# Patient Record
Sex: MALE | Race: WHITE | NOT HISPANIC OR LATINO | Employment: FULL TIME | URBAN - METROPOLITAN AREA
[De-identification: names, ages, dates, MRNs, and addresses within clinical notes are randomized per-mention and may not be internally consistent; named-entity substitution may affect disease eponyms.]

---

## 2018-09-12 NOTE — PROGRESS NOTES
Established Patient Evaluation     Edilberto Jimenez  Encounter Date: 9/18/2018   YOB: 1970  Provider: Barbara Reaves MD.  Requesting MD: Dr. Hurtado  PCP: Dr. Reinalod Iniguez. Onc.: Dr. Tuan Conti. Onc.: Dr. Stack     Chief Complaint: follow up SCCA tonsil --S/P left neck dissection and bx 3/2012: patient known to me   Location: tonsil   Severity: no pain   Onset / Duration: n/a   Timing: constant   Quality: n/a   Context: n/a   Modifying Factors: n/a   Associated Signs / Symptoms: n/a     HPI: Edilberto Jimenez is a 46 y.o. 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      8/23/2017  He presented today for routine followup.    He was just seen by Dr Stack who did blood work and CXR--patient states that is is normal.     No new complaints.  He saw Dr Stack last month.  All tests were normal.   He saw a Functional medicine MD (Dr Sierra)--6 month program.  He lost 20 pounds but gained it back--life style change.     He has low testerone and did well after an injection.    9/18/2018    Here today for routine F/U.   No new masses, pain, bleeding.  He recently had laser treatment for telangiectasias on his neck where he was irradiated  He continues with fatigue  He has low thyroid and testosterone and meds are helping some.  He also C/O bilateral epiphora--intermittent. He denies dry eyes, allergies, nasal masses--no previous hx.       ROS: Review of Systems   Constitutional: Negative for chills, diaphoresis, fever, malaise/fatigue and weight loss.   HENT: Negative for congestion, ear discharge, ear pain, hearing loss, nosebleeds, sore throat and tinnitus otalgia  Dysphagia: Negative  Eyes: Negative for blurred vision, double vision, photophobia, pain, discharge and redness.   Respiratory: Negative for cough, hemoptysis, sputum production, shortness of breath, wheezing and stridor.    Cardiovascular: Negative for chest pain, palpitations, orthopnea,  claudication, leg swelling and PND.   Gastrointestinal: Negative for abdominal pain, blood in stool, constipation, diarrhea, heartburn, melena, nausea and vomiting.   Genitourinary: Negative for dysuria, flank pain, frequency, hematuria and urgency.   Musculoskeletal: Negative for back pain, falls, joint pain, myalgias and neck pain.   Skin: Negative for itching and rash.   Neurological: Negative for dizziness, tingling, tremors, sensory change, speech change, focal weakness, seizures, loss of consciousness, weakness and headaches.   Endo/Heme/Allergies: Negative for environmental allergies and polydipsia (dry mouth). Does not bruise/bleed easily.   Psychiatric/Behavioral: Negative for depression, hallucinations, memory loss, substance abuse and suicidal ideas. The patient is not nervous/anxious and does not have insomnia.        Physical Exam:     Constitutional  · General Appearance: well nourished, well-developed, alert, oriented, in no acute distress  · Communication: ability, understanding, voice quality normal  Head and Face  · Inspection: normocephalic, atraumatic, no scars, lesions or masses   · Palpation: no stepoffs, sinus tenderness or masses  · Parotid glands: no masses, stones, swelling or tenderness  Oral Cavity / Oropharynx  · Lips: upper and lower lips pink and moist  · Teeth: dentition within normal limits for age  · Gingiva: healthy  · Oral Mucosa: moist, no mucosal lesions  · Floor of Mouth: normal, no lesions, salivary ducts patent  · Tongue: moist, normal mobility, no lesions  · Palate: soft and hard palates without lesions or ulcer; soft palate with atrophic mucosa and fibrosis  · opharynx: tonsils absent, no lesions, fullness or obstruction, negative digital palpation  Nasopharynx, Hypopharynx, and Larynx  · Indirect: could not visualize well  Neck  · Inspection and Palpation: no erythema, induration, emphysema, tenderness or masses. Well healed incision left neck -- telangiectasias resolved;   normal crepitus; no masses/edema  · Larynx and Trachea: normal position, mobility and crepitus  · Thyroid: no tenderness, enlargement or nodules  · Submandibular Glands: no masses or tenderness  Lymphatic:  · Anterior, Posterior, Submandibular, Submental, Supraclavicular: no lymphadenopathy present  Neuro:  · Cranial Nerves: grossly intact  · General: no focal deficits     Psychiatric  · Orientation: oriented to time, place and person  · Mood and Affect: no depression, anxiety or agitation        Procedures:  -------------------- LARYNGOSCOPY / NASOPHARYNGOSCOPY -------------------------     Pre-op DX: OP cancer  Post-op DX: OP cancer  Anesthesia: Topical Neosynephrine / Tetracaine  Indications: to evaluate for recurrence  Adverse Events: None        Procedure in Detail:     Flexible endoscopy with video was performed through the nasal passages. The nasal cavity, nasopharynx, oropharynx, hypopharynx and larynx were adequately visualized. The true vocal cords and arytenoids were examined during phonation and repose.        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 However he has not had a CXR in 2 years for surveillance.    Plan:  Routine CXR for surveillance  Rec natural tears 3-4 times/day --if this does not resolve epiphora, then rec for him to see Ophthalmologist  F/U 1 year

## 2018-09-18 ENCOUNTER — OFFICE VISIT (OUTPATIENT)
Dept: OTOLARYNGOLOGY | Facility: CLINIC | Age: 48
End: 2018-09-18
Payer: COMMERCIAL

## 2018-09-18 VITALS
WEIGHT: 212.06 LBS | DIASTOLIC BLOOD PRESSURE: 90 MMHG | BODY MASS INDEX: 33.28 KG/M2 | HEART RATE: 104 BPM | SYSTOLIC BLOOD PRESSURE: 144 MMHG | HEIGHT: 67 IN

## 2018-09-18 DIAGNOSIS — K21.9 LARYNGOPHARYNGEAL REFLUX (LPR): ICD-10-CM

## 2018-09-18 DIAGNOSIS — H04.203 BILATERAL EPIPHORA: ICD-10-CM

## 2018-09-18 DIAGNOSIS — Z85.819 HISTORY OF OROPHARYNGEAL CANCER: Primary | ICD-10-CM

## 2018-09-18 PROCEDURE — 99999 PR PBB SHADOW E&M-NEW PATIENT-LVL III: CPT | Mod: PBBFAC,,, | Performed by: OTOLARYNGOLOGY

## 2018-09-18 PROCEDURE — 31575 DIAGNOSTIC LARYNGOSCOPY: CPT | Mod: S$GLB,,, | Performed by: OTOLARYNGOLOGY

## 2018-09-18 PROCEDURE — 3008F BODY MASS INDEX DOCD: CPT | Mod: CPTII,S$GLB,, | Performed by: OTOLARYNGOLOGY

## 2018-09-18 PROCEDURE — 99214 OFFICE O/P EST MOD 30 MIN: CPT | Mod: 25,S$GLB,, | Performed by: OTOLARYNGOLOGY

## 2018-09-18 RX ORDER — FINASTERIDE 1 MG/1
1 TABLET, FILM COATED ORAL DAILY
Refills: 11 | COMMUNITY
Start: 2018-07-20

## 2018-09-18 RX ORDER — ICOSAPENT ETHYL 1000 MG/1
CAPSULE ORAL
COMMUNITY
Start: 2017-05-05 | End: 2021-05-06 | Stop reason: SDUPTHER

## 2018-09-18 RX ORDER — ESOMEPRAZOLE MAGNESIUM 40 MG/1
CAPSULE, DELAYED RELEASE ORAL
COMMUNITY
Start: 2013-12-13 | End: 2019-03-12 | Stop reason: DRUGHIGH

## 2018-09-18 RX ORDER — SALIVA SUBSTITUTE COMB NO.11 351 MG
POWDER IN PACKET (EA) MUCOUS MEMBRANE
COMMUNITY
Start: 2018-07-05

## 2018-09-18 RX ORDER — TESTOSTERONE CYPIONATE 200 MG/ML
INJECTION, SOLUTION INTRAMUSCULAR
COMMUNITY
Start: 2018-04-18

## 2018-10-02 ENCOUNTER — HOSPITAL ENCOUNTER (OUTPATIENT)
Dept: RADIOLOGY | Facility: HOSPITAL | Age: 48
Discharge: HOME | End: 2018-10-02
Attending: OTOLARYNGOLOGY
Payer: COMMERCIAL

## 2018-10-02 DIAGNOSIS — Z85.819 HISTORY OF OROPHARYNGEAL CANCER: ICD-10-CM

## 2018-10-02 PROCEDURE — 71046 X-RAY EXAM CHEST 2 VIEWS: CPT | Mod: TC,FY,PO

## 2018-10-02 PROCEDURE — 71046 X-RAY EXAM CHEST 2 VIEWS: CPT | Mod: 26,,, | Performed by: RADIOLOGY

## 2018-10-11 ENCOUNTER — TELEPHONE (OUTPATIENT)
Dept: OTOLARYNGOLOGY | Facility: CLINIC | Age: 48
End: 2018-10-11

## 2018-10-11 NOTE — TELEPHONE ENCOUNTER
----- Message from Nasim Duran sent at 10/11/2018 10:06 AM CDT -----  Patient would like call back in regards to some test results - please call to advise. [phone]

## 2018-10-11 NOTE — TELEPHONE ENCOUNTER
----- Message from Sunil Flores sent at 10/11/2018 11:10 AM CDT -----  Type:  Patient Returning Call    Who Called:  Patient  Who Left Message for Patient:  Lluvia  Does the patient know what this is regarding?:  Chest X ray  Best Call Back Number:  [phone]

## 2019-03-04 ENCOUNTER — TELEPHONE (OUTPATIENT)
Dept: OTOLARYNGOLOGY | Facility: CLINIC | Age: 49
End: 2019-03-04

## 2019-03-04 NOTE — TELEPHONE ENCOUNTER
Spoke with pharmacy patient has not picked up a prescription for Nexium since 2018 so prescription is . Explained Dr. Reaves will address once she returns 2019. Also call patient and explained he verbalized understanding.

## 2019-03-04 NOTE — TELEPHONE ENCOUNTER
----- Message from Sirisha Espitia sent at 3/2/2019 10:57 AM CST -----  Contact: patient  Type:  RX Refill Request    Who Called:  patient  Refill or New Rx:  refill  RX Name and Strength:  esomeprazole (NEXIUM) 40 MG capsule    How is the patient currently taking it? (ex. 1XDay):  TAKE 1 CAPSULE BY MOUTH TWICE DAILY BEFORE MEALS  Is this a 30 day or 90 day RX:    Preferred Pharmacy with phone number:    [facility] in [place], [place]   Address: [address] [place] [address]  Phone: [phone]      Local or Mail Order:  local  Ordering Provider:  Jean Paul Patel Call Back Number:  [phone]  Additional Information:

## 2019-03-12 DIAGNOSIS — K21.9 LARYNGOPHARYNGEAL REFLUX (LPR): Primary | ICD-10-CM

## 2019-03-12 RX ORDER — ESOMEPRAZOLE MAGNESIUM 40 MG/1
40 CAPSULE, DELAYED RELEASE ORAL
Qty: 30 CAPSULE | Refills: 11 | Status: SHIPPED | OUTPATIENT
Start: 2019-03-12 | End: 2021-05-06 | Stop reason: SDUPTHER

## 2019-03-25 ENCOUNTER — TELEPHONE (OUTPATIENT)
Dept: OTOLARYNGOLOGY | Facility: CLINIC | Age: 49
End: 2019-03-25

## 2019-03-25 NOTE — TELEPHONE ENCOUNTER
----- Message from Jennifer Whyte sent at 3/25/2019  3:16 PM CDT -----  Contact: self  Patient [phone] is calling to check the status of the prior authorization on the medication Nexium/please advise

## 2019-03-25 NOTE — TELEPHONE ENCOUNTER
Spoke with patient advised was able to contact [facility] pharmacy which is where patient had prescription transferred to, to send over prior authorization form. Received it via fax will have it worked up and sent to pharmacy. Patient verbalized understanding

## 2021-04-21 ENCOUNTER — TELEPHONE (OUTPATIENT)
Dept: HEMATOLOGY/ONCOLOGY | Facility: CLINIC | Age: 51
End: 2021-04-21

## 2021-05-06 ENCOUNTER — OFFICE VISIT (OUTPATIENT)
Dept: HEMATOLOGY/ONCOLOGY | Facility: CLINIC | Age: 51
End: 2021-05-06
Payer: COMMERCIAL

## 2021-05-06 VITALS
HEIGHT: 67 IN | OXYGEN SATURATION: 97 % | WEIGHT: 196.13 LBS | BODY MASS INDEX: 30.78 KG/M2 | HEART RATE: 109 BPM | RESPIRATION RATE: 14 BRPM | DIASTOLIC BLOOD PRESSURE: 97 MMHG | TEMPERATURE: 97 F | SYSTOLIC BLOOD PRESSURE: 161 MMHG

## 2021-05-06 DIAGNOSIS — K21.9 LARYNGOPHARYNGEAL REFLUX (LPR): ICD-10-CM

## 2021-05-06 DIAGNOSIS — Z85.819 HISTORY OF OROPHARYNGEAL CANCER: ICD-10-CM

## 2021-05-06 DIAGNOSIS — Z71.89 ENCOUNTER FOR HERB AND VITAMIN SUPPLEMENT MANAGEMENT: Primary | ICD-10-CM

## 2021-05-06 PROCEDURE — 1126F AMNT PAIN NOTED NONE PRSNT: CPT | Mod: S$GLB,,, | Performed by: OTOLARYNGOLOGY

## 2021-05-06 PROCEDURE — 99213 PR OFFICE/OUTPT VISIT, EST, LEVL III, 20-29 MIN: ICD-10-PCS | Mod: 25,S$GLB,, | Performed by: OTOLARYNGOLOGY

## 2021-05-06 PROCEDURE — 31575 DIAGNOSTIC LARYNGOSCOPY: CPT | Mod: S$GLB,,, | Performed by: OTOLARYNGOLOGY

## 2021-05-06 PROCEDURE — 31575 PR LARYNGOSCOPY, FLEXIBLE; DIAGNOSTIC: ICD-10-PCS | Mod: S$GLB,,, | Performed by: OTOLARYNGOLOGY

## 2021-05-06 PROCEDURE — 3008F BODY MASS INDEX DOCD: CPT | Mod: CPTII,S$GLB,, | Performed by: OTOLARYNGOLOGY

## 2021-05-06 PROCEDURE — 1126F PR PAIN SEVERITY QUANTIFIED, NO PAIN PRESENT: ICD-10-PCS | Mod: S$GLB,,, | Performed by: OTOLARYNGOLOGY

## 2021-05-06 PROCEDURE — 99213 OFFICE O/P EST LOW 20 MIN: CPT | Mod: 25,S$GLB,, | Performed by: OTOLARYNGOLOGY

## 2021-05-06 PROCEDURE — 99999 PR PBB SHADOW E&M-EST. PATIENT-LVL III: CPT | Mod: PBBFAC,,, | Performed by: OTOLARYNGOLOGY

## 2021-05-06 PROCEDURE — 99999 PR PBB SHADOW E&M-EST. PATIENT-LVL III: ICD-10-PCS | Mod: PBBFAC,,, | Performed by: OTOLARYNGOLOGY

## 2021-05-06 PROCEDURE — 3008F PR BODY MASS INDEX (BMI) DOCUMENTED: ICD-10-PCS | Mod: CPTII,S$GLB,, | Performed by: OTOLARYNGOLOGY

## 2021-05-06 RX ORDER — ICOSAPENT ETHYL 1000 MG/1
1 CAPSULE ORAL 2 TIMES DAILY
Qty: 60 CAPSULE | Refills: 6 | Status: SHIPPED | OUTPATIENT
Start: 2021-05-06 | End: 2021-06-05

## 2021-05-06 RX ORDER — THYROID 60 MG/1
60 TABLET ORAL
COMMUNITY

## 2021-05-06 RX ORDER — ESOMEPRAZOLE MAGNESIUM 40 MG/1
40 CAPSULE, DELAYED RELEASE ORAL
Qty: 30 CAPSULE | Refills: 11 | Status: SHIPPED | OUTPATIENT
Start: 2021-05-06 | End: 2022-11-11 | Stop reason: SDUPTHER

## 2022-04-07 ENCOUNTER — TELEPHONE (OUTPATIENT)
Dept: HEMATOLOGY/ONCOLOGY | Facility: CLINIC | Age: 52
End: 2022-04-07
Payer: COMMERCIAL

## 2022-05-03 NOTE — PROGRESS NOTES
Date of Encounter: 5/6/2021  Provider: Barbara Reaves MD  PCP: Reinaldo Boss Jr, MD  Rad Onc: Alex Dickerson MD  Med Onc: Jose M Stack MD    CC: history of oropharyngeal cancer S/P neck dissection and chemoradiation    HPI:      The patient is a 50-year-old male who is well known to me.  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  He presents today for routine cancer surveillance.  He has not undergone examination in about a year due to the pandemic and personal issues (his wife was diagnosed and treated for breast cancer).  He has no complaints.  He denies dysphagia, odynophagia, otalgia, hemoptysis and new lesions.    He reports that he continues to take Nexium on an as-needed basis and would like a refill.    The patient is followed by Dr. Isra Stack the [facility] in [facility].  He has a history of iron deficiency anemia due to a previous surgery for emergency appendectomy in 2004.  He has had no recent blood transfusions.    Patient also reports that he is now on a diet and exercise program and he is feeling better.    5/5/2022  Patient is here today for his yearly follow-up for cancer surveillance.  Patient has no complaints.  He denies dysphagia, odynophagia, otalgia, neck masses and weight loss.  He is tolerating a regular diet and has no physical limitations following his neck dissection.      ROS: see HPI  Constitutional: Negative for activity change and appetite change, weight loss.   Eyes: Negative for discharge, visual changes.   Respiratory: Negative for difficulty breathing and wheezing   Cardiovascular: Negative for chest pain.   Gastrointestinal: Negative for abdominal distention and abdominal pain.   Endocrine: Negative for cold intolerance and heat intolerance.   Genitourinary: Negative for dysuria.   Musculoskeletal: Negative for gait problem, muscle pain and joint swelling.   Skin:  Negative for color change and pallor; negative for skin lesions.   Neurological: Negative for syncope and weakness; no numbness face.   Psychiatric/Behavioral: Negative for agitation and confusion; negative for depression.    Physical Exam:      Constitutional  · General Appearance: well nourished, well-developed, alert, oriented, in no acute distress; over weight  · Communication: ability, understanding, normal  Head and Face  · Inspection: normocephalic, atraumatic, no scars, lesions or masses   · Palpation: no stepoffs, sinus tenderness or masses  · Parotid glands: no masses, stones, swelling or tenderness  Eyes  · Ocular Motility / Alignment: normal alignment, motility, no proptosis, enophthalmus or nystagmus  · Conjunctiva: not injected  · Eyelids: no hooding, lag, entropion, or ectropion  Ears  · Hearing: speech reception thresholds grossly normal  · External Ears: no auricle lesions, non-tender, mobile to palpation  · Otoscopy:  · Right Ear: no tympanic membrane lesions, perforations, or effusion, normal EAC  · Left Ear: no tympanic membrane lesions, perforations, or effusion, normal EAC  Nose  · External Nose: no lesions, tenderness, trauma or deformity  · Intranasal Exam: no edema, erythema, discharge, mass or obstruction  Oral Cavity / Oropharynx  · Lips: upper and lower lips pink and moist  · Teeth: good dentition  · Gingiva: healthy  · Oral Mucosa: moist, no mucosal lesions  · Floor of Mouth: normal, no lesions, salivary ducts patent  · Tongue: moist, normal mobility, no lesions  · Palate: soft and hard palates without lesions or ulcers  Oropharynx: tonsils absent; base of tongue soft to palpation, soft palate fibrotic  Nasopharynx, Hypopharynx, and Larynx  · Indirect: could not perform exam due to intolerance by patient  Neck  · Inspection and Palpation: no erythema, induration, emphysema, tenderness or masses, well healed hockey stick incision left neck  · Larynx and Trachea: normal position; normal  crepitus  · Thyroid: no tenderness, enlargement or nodules  · Submandibular Glands: no masses or tenderness  Lymphatic:  · Anterior, Posterior, Submandibular, Submental, Supraclavicular: no lymphadenopathy present  Chest / Respiratory  · Chest: no stridor or retractions, normal effort and expansion  Cardiovascular:  · Pulses: 2+ carotid pulses bilaterally  · Auscultation: deferred  Neurological  · Cranial Nerves: grossly intact  · General: no focal deficits  Psychiatric  · Orientation: oriented to time, place and person  · Mood and Affect: no depression, anxiety or agitation  Extremities  · Inspection: moves all extremities well  Donor site  Chest, Back, Abdomen, Arms, Legs: N/A    PROCEDURES:   -------------------- LARYNGOSCOPY / NASOPHARYNGOSCOPY -------------------------     Pre-op DX: hx of OP cancer; LPR  Post-op DX: same  Anesthesia: Topical Neosynephrine / Tetracaine  Indications: to look at larynx and pharynx  Adverse Events: None        Procedure in Detail:     Flexible endoscopy with video was performed through the nasal passages. The nasal cavity, nasopharynx, oropharynx, hypopharynx and larynx were adequately visualized. The true vocal cords and arytenoids were examined during phonation and repose.        Operative Findings:     Nasal Cavity: Within normal limits  Nasopharynx: Within normal limits  Tongue Base: Within normal limits  Pharyngeal Walls: Within normal limits  Epiglottis / Aryepiglottic Folds: Within normal limits  Pyriform Sinus: Within normal limits  Vocal Cords: Within normal limitse  Arytenoids: Within normal limits; ,mild IA edema        Assessment:   History of oropharyngeal carcinoma-LIVIA    Plan:  Follow-up in 1 year

## 2022-05-05 ENCOUNTER — OFFICE VISIT (OUTPATIENT)
Dept: HEMATOLOGY/ONCOLOGY | Facility: CLINIC | Age: 52
End: 2022-05-05
Payer: COMMERCIAL

## 2022-05-05 VITALS
DIASTOLIC BLOOD PRESSURE: 86 MMHG | HEART RATE: 110 BPM | RESPIRATION RATE: 18 BRPM | OXYGEN SATURATION: 97 % | SYSTOLIC BLOOD PRESSURE: 137 MMHG | WEIGHT: 210.31 LBS | HEIGHT: 67 IN | TEMPERATURE: 98 F | BODY MASS INDEX: 33.01 KG/M2

## 2022-05-05 DIAGNOSIS — Z85.819 HISTORY OF OROPHARYNGEAL CANCER: Primary | ICD-10-CM

## 2022-05-05 PROCEDURE — 31575 PR LARYNGOSCOPY, FLEXIBLE; DIAGNOSTIC: ICD-10-PCS | Mod: S$GLB,,, | Performed by: OTOLARYNGOLOGY

## 2022-05-05 PROCEDURE — 3008F PR BODY MASS INDEX (BMI) DOCUMENTED: ICD-10-PCS | Mod: CPTII,S$GLB,, | Performed by: OTOLARYNGOLOGY

## 2022-05-05 PROCEDURE — 1160F PR REVIEW ALL MEDS BY PRESCRIBER/CLIN PHARMACIST DOCUMENTED: ICD-10-PCS | Mod: CPTII,S$GLB,, | Performed by: OTOLARYNGOLOGY

## 2022-05-05 PROCEDURE — 3008F BODY MASS INDEX DOCD: CPT | Mod: CPTII,S$GLB,, | Performed by: OTOLARYNGOLOGY

## 2022-05-05 PROCEDURE — 3075F PR MOST RECENT SYSTOLIC BLOOD PRESS GE 130-139MM HG: ICD-10-PCS | Mod: CPTII,S$GLB,, | Performed by: OTOLARYNGOLOGY

## 2022-05-05 PROCEDURE — 1160F RVW MEDS BY RX/DR IN RCRD: CPT | Mod: CPTII,S$GLB,, | Performed by: OTOLARYNGOLOGY

## 2022-05-05 PROCEDURE — 99214 OFFICE O/P EST MOD 30 MIN: CPT | Mod: 25,S$GLB,, | Performed by: OTOLARYNGOLOGY

## 2022-05-05 PROCEDURE — 3079F PR MOST RECENT DIASTOLIC BLOOD PRESSURE 80-89 MM HG: ICD-10-PCS | Mod: CPTII,S$GLB,, | Performed by: OTOLARYNGOLOGY

## 2022-05-05 PROCEDURE — 31575 DIAGNOSTIC LARYNGOSCOPY: CPT | Mod: S$GLB,,, | Performed by: OTOLARYNGOLOGY

## 2022-05-05 PROCEDURE — 3075F SYST BP GE 130 - 139MM HG: CPT | Mod: CPTII,S$GLB,, | Performed by: OTOLARYNGOLOGY

## 2022-05-05 PROCEDURE — 99214 PR OFFICE/OUTPT VISIT, EST, LEVL IV, 30-39 MIN: ICD-10-PCS | Mod: 25,S$GLB,, | Performed by: OTOLARYNGOLOGY

## 2022-05-05 PROCEDURE — 1159F PR MEDICATION LIST DOCUMENTED IN MEDICAL RECORD: ICD-10-PCS | Mod: CPTII,S$GLB,, | Performed by: OTOLARYNGOLOGY

## 2022-05-05 PROCEDURE — 1159F MED LIST DOCD IN RCRD: CPT | Mod: CPTII,S$GLB,, | Performed by: OTOLARYNGOLOGY

## 2022-05-05 PROCEDURE — 3079F DIAST BP 80-89 MM HG: CPT | Mod: CPTII,S$GLB,, | Performed by: OTOLARYNGOLOGY

## 2022-05-05 PROCEDURE — 99999 PR PBB SHADOW E&M-EST. PATIENT-LVL IV: CPT | Mod: PBBFAC,,, | Performed by: OTOLARYNGOLOGY

## 2022-05-05 PROCEDURE — 99999 PR PBB SHADOW E&M-EST. PATIENT-LVL IV: ICD-10-PCS | Mod: PBBFAC,,, | Performed by: OTOLARYNGOLOGY

## 2022-11-11 ENCOUNTER — TELEPHONE (OUTPATIENT)
Dept: HEMATOLOGY/ONCOLOGY | Facility: CLINIC | Age: 52
End: 2022-11-11
Payer: COMMERCIAL

## 2022-11-11 DIAGNOSIS — K21.9 LARYNGOPHARYNGEAL REFLUX (LPR): ICD-10-CM

## 2022-11-11 NOTE — TELEPHONE ENCOUNTER
----- Message from Hung Carrasco sent at 2022  1:00 PM CST -----  Type: Need Medical Advice  Who Called: Wife of patient  Best callback number:[phone]   Additional Info:Wife called and would like his prescription sent to the above pharmacy and would like a 90 day supply with refills   Please call to further assist, Thanks        Pharmacy Name:  [facility] [id number] [address]  Phone   Prescription Name:  esomeprazole (NEXIUM) 40 MG capsule (   Best Call Back Number: [phone]

## 2022-11-12 RX ORDER — ESOMEPRAZOLE MAGNESIUM 40 MG/1
40 CAPSULE, DELAYED RELEASE ORAL
Qty: 90 CAPSULE | Refills: 3 | Status: SHIPPED | OUTPATIENT
Start: 2022-11-12 | End: 2023-11-12

## 2022-12-02 ENCOUNTER — TELEPHONE (OUTPATIENT)
Dept: HEMATOLOGY/ONCOLOGY | Facility: CLINIC | Age: 52
End: 2022-12-02
Payer: COMMERCIAL

## 2022-12-05 ENCOUNTER — OFFICE VISIT (OUTPATIENT)
Dept: HEMATOLOGY/ONCOLOGY | Facility: CLINIC | Age: 52
End: 2022-12-05
Payer: COMMERCIAL

## 2022-12-05 VITALS
BODY MASS INDEX: 34.95 KG/M2 | RESPIRATION RATE: 16 BRPM | TEMPERATURE: 98 F | DIASTOLIC BLOOD PRESSURE: 101 MMHG | OXYGEN SATURATION: 95 % | HEIGHT: 67 IN | SYSTOLIC BLOOD PRESSURE: 153 MMHG | HEART RATE: 92 BPM | WEIGHT: 222.69 LBS

## 2022-12-05 DIAGNOSIS — K13.79 LESION OF HARD PALATE: Primary | ICD-10-CM

## 2022-12-05 PROCEDURE — 3008F PR BODY MASS INDEX (BMI) DOCUMENTED: ICD-10-PCS | Mod: CPTII,S$GLB,, | Performed by: OTOLARYNGOLOGY

## 2022-12-05 PROCEDURE — 3077F PR MOST RECENT SYSTOLIC BLOOD PRESSURE >= 140 MM HG: ICD-10-PCS | Mod: CPTII,S$GLB,, | Performed by: OTOLARYNGOLOGY

## 2022-12-05 PROCEDURE — 1160F RVW MEDS BY RX/DR IN RCRD: CPT | Mod: CPTII,S$GLB,, | Performed by: OTOLARYNGOLOGY

## 2022-12-05 PROCEDURE — 1159F MED LIST DOCD IN RCRD: CPT | Mod: CPTII,S$GLB,, | Performed by: OTOLARYNGOLOGY

## 2022-12-05 PROCEDURE — 99213 PR OFFICE/OUTPT VISIT, EST, LEVL III, 20-29 MIN: ICD-10-PCS | Mod: S$GLB,,, | Performed by: OTOLARYNGOLOGY

## 2022-12-05 PROCEDURE — 3080F DIAST BP >= 90 MM HG: CPT | Mod: CPTII,S$GLB,, | Performed by: OTOLARYNGOLOGY

## 2022-12-05 PROCEDURE — 1160F PR REVIEW ALL MEDS BY PRESCRIBER/CLIN PHARMACIST DOCUMENTED: ICD-10-PCS | Mod: CPTII,S$GLB,, | Performed by: OTOLARYNGOLOGY

## 2022-12-05 PROCEDURE — 99999 PR PBB SHADOW E&M-EST. PATIENT-LVL IV: CPT | Mod: PBBFAC,,, | Performed by: OTOLARYNGOLOGY

## 2022-12-05 PROCEDURE — 99213 OFFICE O/P EST LOW 20 MIN: CPT | Mod: S$GLB,,, | Performed by: OTOLARYNGOLOGY

## 2022-12-05 PROCEDURE — 3008F BODY MASS INDEX DOCD: CPT | Mod: CPTII,S$GLB,, | Performed by: OTOLARYNGOLOGY

## 2022-12-05 PROCEDURE — 3077F SYST BP >= 140 MM HG: CPT | Mod: CPTII,S$GLB,, | Performed by: OTOLARYNGOLOGY

## 2022-12-05 PROCEDURE — 3080F PR MOST RECENT DIASTOLIC BLOOD PRESSURE >= 90 MM HG: ICD-10-PCS | Mod: CPTII,S$GLB,, | Performed by: OTOLARYNGOLOGY

## 2022-12-05 PROCEDURE — 1159F PR MEDICATION LIST DOCUMENTED IN MEDICAL RECORD: ICD-10-PCS | Mod: CPTII,S$GLB,, | Performed by: OTOLARYNGOLOGY

## 2022-12-05 PROCEDURE — 99999 PR PBB SHADOW E&M-EST. PATIENT-LVL IV: ICD-10-PCS | Mod: PBBFAC,,, | Performed by: OTOLARYNGOLOGY

## 2022-12-05 RX ORDER — DUTASTERIDE 0.5 MG/1
0.5 CAPSULE, LIQUID FILLED ORAL
COMMUNITY
Start: 2022-11-17

## 2022-12-05 RX ORDER — TRIAMCINOLONE ACETONIDE 1 MG/G
PASTE DENTAL
Qty: 5 G | Refills: 12 | Status: SHIPPED | OUTPATIENT
Start: 2022-12-05 | End: 2022-12-05 | Stop reason: CLARIF

## 2022-12-05 RX ORDER — TRIAMCINOLONE ACETONIDE 1 MG/G
PASTE DENTAL
Qty: 5 G | Refills: 1 | Status: SHIPPED | OUTPATIENT
Start: 2022-12-05

## 2022-12-05 NOTE — PROGRESS NOTES
Date of Encounter: 5/6/2021  Provider: Barbara Reaves MD  PCP: Reinaldo Boss Jr, MD  Rad Onc: Alex Dickerson MD  Med Onc: Jose M Stack MD    CC: history of oropharyngeal cancer S/P neck dissection and chemoradiation    HPI:      The patient is a 50-year-old male who is well known to me.  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  He presents today for routine cancer surveillance.  He has not undergone examination in about a year due to the pandemic and personal issues (his wife was diagnosed and treated for breast cancer).  He has no complaints.  He denies dysphagia, odynophagia, otalgia, hemoptysis and new lesions.    He reports that he continues to take Nexium on an as-needed basis and would like a refill.    The patient is followed by Dr. Isra Stack the [facility] in [facility].  He has a history of iron deficiency anemia due to a previous surgery for emergency appendectomy in 2004.  He has had no recent blood transfusions.    Patient also reports that he is now on a diet and exercise program and he is feeling better.    5/5/2022  Patient is here today for his yearly follow-up for cancer surveillance.  Patient has no complaints.  He denies dysphagia, odynophagia, otalgia, neck masses and weight loss.  He is tolerating a regular diet and has no physical limitations following his neck dissection.    12/5/2022  Patient is here today for evaluation of a reddish lesion roof of mouth and white lesion left cheek noted by his dentist.    Patient denies pain, bleeding or any other symptoms.  He did start using a new oral spray about a month ago.      ROS: see HPI  Constitutional: Negative for activity change and appetite change, weight loss.   Eyes: Negative for discharge, visual changes.   Respiratory: Negative for difficulty breathing and wheezing   Cardiovascular: Negative for chest pain.    Gastrointestinal: Negative for abdominal distention and abdominal pain.   Endocrine: Negative for cold intolerance and heat intolerance.   Genitourinary: Negative for dysuria.   Musculoskeletal: Negative for gait problem, muscle pain and joint swelling.   Skin: Negative for color change and pallor; negative for skin lesions.   Neurological: Negative for syncope and weakness; no numbness face.   Psychiatric/Behavioral: Negative for agitation and confusion; negative for depression.    Physical Exam:      Constitutional  General Appearance: well nourished, well-developed, alert, oriented, in no acute distress; over weight  Communication: ability, understanding, normal  Head and Face  Inspection: normocephalic, atraumatic, no scars, lesions or masses   Palpation: no stepoffs, sinus tenderness or masses  Parotid glands: no masses, stones, swelling or tenderness  Oral Cavity / Oropharynx  Lips: upper and lower lips pink and moist  Teeth: good dentition  Gingiva: healthy  Oral Mucosa: moist, no mucosal lesions; < 1 cm white lesion, barely visible, left mid cheek; smooth to palpation  Floor of Mouth: normal, no lesions, salivary ducts patent  Tongue: moist, normal mobility, no lesions  Palate: hard palate: red, irritated appearing lesion anterior hard palate; smooth to palpation; no raised; soft palate with atrophic mucosa  Neurological  Cranial Nerves: grossly intact  General: no focal deficits  Psychiatric  Orientation: oriented to time, place and person  Mood and Affect: no depression, anxiety or agitation  Extremities  Inspection: moves all extremities well         Assessment:   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

## 2023-10-04 ENCOUNTER — TELEPHONE (OUTPATIENT)
Dept: SURGERY | Facility: CLINIC | Age: 53
End: 2023-10-04
Payer: COMMERCIAL

## 2023-10-04 ENCOUNTER — OFFICE VISIT (OUTPATIENT)
Dept: HEMATOLOGY/ONCOLOGY | Facility: CLINIC | Age: 53
End: 2023-10-04
Payer: COMMERCIAL

## 2023-10-04 VITALS
TEMPERATURE: 98 F | OXYGEN SATURATION: 97 % | HEIGHT: 67 IN | WEIGHT: 193.56 LBS | SYSTOLIC BLOOD PRESSURE: 130 MMHG | HEART RATE: 114 BPM | DIASTOLIC BLOOD PRESSURE: 92 MMHG | RESPIRATION RATE: 20 BRPM | BODY MASS INDEX: 30.38 KG/M2

## 2023-10-04 DIAGNOSIS — M62.89 FIBROSIS OF MUSCLE OF UPPER EXTREMITY: ICD-10-CM

## 2023-10-04 DIAGNOSIS — R59.0 LOCALIZED ENLARGED LYMPH NODES: ICD-10-CM

## 2023-10-04 DIAGNOSIS — Z85.819 HISTORY OF OROPHARYNGEAL CANCER: Primary | ICD-10-CM

## 2023-10-04 PROCEDURE — 99999 PR PBB SHADOW E&M-EST. PATIENT-LVL IV: CPT | Mod: PBBFAC,,, | Performed by: OTOLARYNGOLOGY

## 2023-10-04 PROCEDURE — 3080F DIAST BP >= 90 MM HG: CPT | Mod: CPTII,S$GLB,, | Performed by: OTOLARYNGOLOGY

## 2023-10-04 PROCEDURE — 3080F PR MOST RECENT DIASTOLIC BLOOD PRESSURE >= 90 MM HG: ICD-10-PCS | Mod: CPTII,S$GLB,, | Performed by: OTOLARYNGOLOGY

## 2023-10-04 PROCEDURE — 1159F MED LIST DOCD IN RCRD: CPT | Mod: CPTII,S$GLB,, | Performed by: OTOLARYNGOLOGY

## 2023-10-04 PROCEDURE — 3008F BODY MASS INDEX DOCD: CPT | Mod: CPTII,S$GLB,, | Performed by: OTOLARYNGOLOGY

## 2023-10-04 PROCEDURE — 1159F PR MEDICATION LIST DOCUMENTED IN MEDICAL RECORD: ICD-10-PCS | Mod: CPTII,S$GLB,, | Performed by: OTOLARYNGOLOGY

## 2023-10-04 PROCEDURE — 99213 PR OFFICE/OUTPT VISIT, EST, LEVL III, 20-29 MIN: ICD-10-PCS | Mod: S$GLB,,, | Performed by: OTOLARYNGOLOGY

## 2023-10-04 PROCEDURE — 99213 OFFICE O/P EST LOW 20 MIN: CPT | Mod: S$GLB,,, | Performed by: OTOLARYNGOLOGY

## 2023-10-04 PROCEDURE — 3075F PR MOST RECENT SYSTOLIC BLOOD PRESS GE 130-139MM HG: ICD-10-PCS | Mod: CPTII,S$GLB,, | Performed by: OTOLARYNGOLOGY

## 2023-10-04 PROCEDURE — 1160F RVW MEDS BY RX/DR IN RCRD: CPT | Mod: CPTII,S$GLB,, | Performed by: OTOLARYNGOLOGY

## 2023-10-04 PROCEDURE — 3075F SYST BP GE 130 - 139MM HG: CPT | Mod: CPTII,S$GLB,, | Performed by: OTOLARYNGOLOGY

## 2023-10-04 PROCEDURE — 99999 PR PBB SHADOW E&M-EST. PATIENT-LVL IV: ICD-10-PCS | Mod: PBBFAC,,, | Performed by: OTOLARYNGOLOGY

## 2023-10-04 PROCEDURE — 1160F PR REVIEW ALL MEDS BY PRESCRIBER/CLIN PHARMACIST DOCUMENTED: ICD-10-PCS | Mod: CPTII,S$GLB,, | Performed by: OTOLARYNGOLOGY

## 2023-10-04 PROCEDURE — 3008F PR BODY MASS INDEX (BMI) DOCUMENTED: ICD-10-PCS | Mod: CPTII,S$GLB,, | Performed by: OTOLARYNGOLOGY

## 2023-10-11 PROBLEM — M62.89 FIBROSIS OF MUSCLE OF UPPER EXTREMITY: Status: ACTIVE | Noted: 2023-10-11

## 2023-10-11 NOTE — PROGRESS NOTES
Date of Encounter: 5/6/2021  Provider: Barbara Reaves MD  PCP: Reinaldo Boss Jr, MD  Rad Onc: Alex Dickerson MD  Med Onc: Jose M Stack MD    CC: history of oropharyngeal cancer S/P neck dissection and chemoradiation    HPI:      The patient is a 50-year-old male who is well known to me.  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  He presents today for routine cancer surveillance.  He has not undergone examination in about a year due to the pandemic and personal issues (his wife was diagnosed and treated for breast cancer).  He has no complaints.  He denies dysphagia, odynophagia, otalgia, hemoptysis and new lesions.    He reports that he continues to take Nexium on an as-needed basis and would like a refill.    The patient is followed by Dr. Isra Stack the [facility] in [facility].  He has a history of iron deficiency anemia due to a previous surgery for emergency appendectomy in 2004.  He has had no recent blood transfusions.    Patient also reports that he is now on a diet and exercise program and he is feeling better.    5/5/2022  Patient is here today for his yearly follow-up for cancer surveillance.  Patient has no complaints.  He denies dysphagia, odynophagia, otalgia, neck masses and weight loss.  He is tolerating a regular diet and has no physical limitations following his neck dissection.    12/5/2022  Patient is here today for evaluation of a reddish lesion roof of mouth and white lesion left cheek noted by his dentist.    Patient denies pain, bleeding or any other symptoms.  He did start using a new oral spray about a month ago.    10/4/2023  Patient is here today complaining of fullness in his left neck in the area of previous surgery.  He is concerned.  He reports that he is currently undergoing massages and myofascial release for his neck discomfort.  He denies any new lesions, oral cavity  or pharyngeal pain, hemoptysis.      ROS: see HPI  Constitutional: Negative for activity change and appetite change, weight loss.   Eyes: Negative for discharge, visual changes.   Respiratory: Negative for difficulty breathing and wheezing   Cardiovascular: Negative for chest pain.   Gastrointestinal: Negative for abdominal distention and abdominal pain.   Endocrine: Negative for cold intolerance and heat intolerance.   Genitourinary: Negative for dysuria.   Musculoskeletal: Negative for gait problem, muscle pain and joint swelling.   Skin: Negative for color change and pallor; negative for skin lesions.   Neurological: Negative for syncope and weakness; no numbness face.   Psychiatric/Behavioral: Negative for agitation and confusion; negative for depression.    Physical Exam:      Constitutional  General Appearance: well nourished, well-developed, alert, oriented, in no acute distress; over weight  Communication: ability, understanding, normal  Head and Face  Inspection: normocephalic, atraumatic, no scars, lesions or masses   Palpation: no stepoffs, sinus tenderness or masses  Parotid glands: no masses, stones, swelling or tenderness  Neck:            Fibrosis/spasms bilateral neck muscles, L>R (trapezius and SCM); discomfort with stretching neck muscles; well healed left sided neck incision  Nodes:         No enlarged nodes palpated  Extremities  Inspection: moves all extremities well         Assessment:   Bilateral fibrosis/spasms neck mm, 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

## 2023-10-18 ENCOUNTER — TELEPHONE (OUTPATIENT)
Dept: HEMATOLOGY/ONCOLOGY | Facility: CLINIC | Age: 53
End: 2023-10-18
Payer: COMMERCIAL

## 2023-10-30 ENCOUNTER — TELEPHONE (OUTPATIENT)
Dept: HEMATOLOGY/ONCOLOGY | Facility: CLINIC | Age: 53
End: 2023-10-30
Payer: COMMERCIAL

## 2023-10-30 NOTE — TELEPHONE ENCOUNTER
Noted pt's CT was canceled, called to assist pt rescheduling appt.  Pt states that he does not want to have CT done.  Pt has called his oncologist at [facility], Dr Stack to have any additional scans needed ordered at [facility].  Pt agreed to request scan results be faxed to Dr Reaves's office.